# Patient Record
Sex: FEMALE | Race: WHITE | NOT HISPANIC OR LATINO | Employment: FULL TIME | URBAN - METROPOLITAN AREA
[De-identification: names, ages, dates, MRNs, and addresses within clinical notes are randomized per-mention and may not be internally consistent; named-entity substitution may affect disease eponyms.]

---

## 2019-01-19 ENCOUNTER — THERAPY VISIT (OUTPATIENT)
Dept: PHYSICAL THERAPY | Facility: CLINIC | Age: 24
End: 2019-01-19
Payer: COMMERCIAL

## 2019-01-19 DIAGNOSIS — M54.59 MECHANICAL LOW BACK PAIN: Primary | ICD-10-CM

## 2019-01-19 PROCEDURE — 97110 THERAPEUTIC EXERCISES: CPT | Mod: GP | Performed by: PHYSICAL THERAPIST

## 2019-01-19 PROCEDURE — 97112 NEUROMUSCULAR REEDUCATION: CPT | Mod: GP | Performed by: PHYSICAL THERAPIST

## 2019-01-19 PROCEDURE — 97161 PT EVAL LOW COMPLEX 20 MIN: CPT | Mod: GP | Performed by: PHYSICAL THERAPIST

## 2019-01-19 PROCEDURE — 97140 MANUAL THERAPY 1/> REGIONS: CPT | Mod: GP | Performed by: PHYSICAL THERAPIST

## 2019-01-19 ASSESSMENT — ACTIVITIES OF DAILY LIVING (ADL): STAND: I AM UNABLE TO DO THE ACTIVITY

## 2019-01-19 NOTE — PROGRESS NOTES
[facility] Initial Evaluation  Subjective:  Pt reports about 2 months ago she began to have LBP and as she worked out more it has worsened and spread into her hips.        The history is provided by the patient.   Nicki Quezada is a 23 year old female with a sacroiliac and lumbar condition.    Condition occurred: during recreation/sport.  This is a new condition  11.1.18 .    Patient reports pain:  Central lumbar spine, lumbar spine right and lumbar spine left.    Pain is described as aching and stabbing and is intermittent and reported as 5/10 and 8/10.  Associated symptoms:  Loss of motion/stiffness.    and relieved by NSAID's.  Since onset symptoms are gradually worsening.        General health as reported by patient is excellent.          Current occupation is Student, works out regularily, .            Red flags:  None as reported by the patient.                        Objective:  System         Lumbar/SI Evaluation  ROM:    AROM Lumbar:   Flexion:        Mod loss   Ext:                    Mod loss ++    Side Bend:        Left:     Right:   Rotation:           Left:     Right:   Side Glide:        Left:     Right:           Lumbar Myotomes:  not assessed            Lumbar DTR's:  not assessed      Cord Signs:  not assessed    Lumbar Dermtomes:  not assessed                Neural Tension/Mobility:  Lumbar:  Normal        Lumbar Palpation:    Tenderness present at Left:    Quadratus Lumborum; Piriformis; Gluteus Medius and Greater Trochanter  Tenderness present at Right: Quadratus Lumborum; Piriformis; Gluteus Medius and Greater Trochanter        SI joint/Sacrum:    bilat SI dysf, post rot and flare, sacral torsion, pub        Left negative at:    Ilium Ventromedial  Right positive at:    Ilium Ventromedial  Sacral conclusion left:  Outflare, descended pubic sym and upslip  Sacral conclusion right:  Posterior inominate, locked, inflare, elevated pubic sym and sacral torsion                                              General     ROS    Assessment/Plan:    Patient is a 23 year old female with lumbar and both sides hip complaints.    Patient has the following significant findings with corresponding treatment plan.                Diagnosis 1:  lbp  Pain -  manual therapy and home program  Decreased ROM/flexibility - manual therapy, therapeutic exercise and home program  Decreased joint mobility - manual therapy, therapeutic exercise and home program  Impaired muscle performance - neuro re-education and home program  Decreased function - therapeutic activities  Instability -  Therapeutic Activity  Therapeutic Exercise    Therapy Evaluation Codes:   1) History comprised of:   Personal factors that impact the plan of care:      Coping style, Overall behavior pattern and Past/current experiences.    Comorbidity factors that impact the plan of care are:      None.     Medications impacting care: Pain.  2) Examination of Body Systems comprised of:   Body structures and functions that impact the plan of care:      Hip and Lumbar spine.   Activity limitations that impact the plan of care are:      Bending, Cooking, Sitting, Sports, Squatting/kneeling, Stairs, Standing and Walking.  3) Clinical presentation characteristics are:   Stable/Uncomplicated.  4) Decision-Making    Low complexity using standardized patient assessment instrument and/or measureable assessment of functional outcome.  Cumulative Therapy Evaluation is: Low complexity.    Previous and current functional limitations:  (See Goal Flow Sheet for this information)    Short term and Long term goals: (See Goal Flow Sheet for this information)     Communication ability:  Patient appears to be able to clearly communicate and understand verbal and written communication and follow directions correctly.  Treatment Explanation - The following has been discussed with the patient:   RX ordered/plan of care  Anticipated outcomes  Possible risks and side  effects  This patient would benefit from PT intervention to resume normal activities.   Rehab potential is good.    Frequency:  1 X week, once daily  Duration:  for 4 weeks  Discharge Plan:  Achieve all LTG.  Independent in home treatment program.  Reach maximal therapeutic benefit.    Please refer to the daily flowsheet for treatment today, total treatment time and time spent performing 1:1 timed codes.

## 2019-01-21 NOTE — PROGRESS NOTES
[facility] Initial Evaluation  Subjective:                     and reported as 5/10.                General health as reported by patient is excellent.          Current occupation is Student.    Primary job tasks include:  Prolonged sitting.              Oswestry Score: 14 %                 Objective:  System    Physical Exam    General     ROS    Assessment/Plan:

## 2019-01-21 NOTE — PROGRESS NOTES
[facility] Initial Evaluation  Subjective:  HPI                    Objective:  System    Physical Exam  General     ROS    Assessment/Plan:

## 2019-02-05 ENCOUNTER — THERAPY VISIT (OUTPATIENT)
Dept: PHYSICAL THERAPY | Facility: CLINIC | Age: 24
End: 2019-02-05
Payer: COMMERCIAL

## 2019-02-05 DIAGNOSIS — M54.50 LUMBAGO: ICD-10-CM

## 2019-02-05 PROCEDURE — 97112 NEUROMUSCULAR REEDUCATION: CPT | Mod: GP | Performed by: PHYSICAL THERAPIST

## 2019-02-05 PROCEDURE — 97140 MANUAL THERAPY 1/> REGIONS: CPT | Mod: GP | Performed by: PHYSICAL THERAPIST

## 2019-02-05 PROCEDURE — 97110 THERAPEUTIC EXERCISES: CPT | Mod: GP | Performed by: PHYSICAL THERAPIST

## 2019-05-15 ENCOUNTER — RECORDS - HEALTHEAST (OUTPATIENT)
Dept: LAB | Facility: CLINIC | Age: 24
End: 2019-05-15

## 2019-05-15 LAB — TSH SERPL DL<=0.005 MIU/L-ACNC: 1.37 UIU/ML (ref 0.3–5)

## 2019-05-16 LAB — 25(OH)D3 SERPL-MCNC: 38.3 NG/ML (ref 30–80)

## 2019-10-22 PROBLEM — M54.50 LUMBAGO: Status: RESOLVED | Noted: 2019-02-05 | Resolved: 2019-10-22

## 2019-10-22 NOTE — PROGRESS NOTES
Discharge Note    Progress reporting period is from initial eval to Feb 5, 2019.     Nicki failed to return for next follow up visit and current status is unknown.  Please see information below for last relevant information on current status.  Patient seen for Rxs Used: 2 visits.    SUBJECTIVE  Subjective changes noted by patient:  Subjective: Feeling nearly 100% better. Hasn't tested it getting back in to cycling classes yet though. .  Current pain level is Current Pain level: 0/10.     Previous pain level was  Initial Pain level: 8/10.   Changes in function:  Yes (See Goal flowsheet attached for changes in current functional level)  Adverse reaction to treatment or activity: None    OBJECTIVE  Changes noted in objective findings: Objective: FIS produces mild R LBP, EIS no effect. TTP R QL, tolerates soft tissue well and has painfree FIS after.    ASSESSMENT/PLAN  Diagnosis: lbp    DIAGP:  The encounter diagnosis was Lumbago.  Updated problem list and treatment plan:     Pain - HEP  Decreased ROM/flexibility - HEP  Decreased function - HEP  Decreased strength - HEP    STG/LTGs have been met or progress has been made towards goals:  Yes, please see goal flowsheet for most current information.    Assessment of Progress: current status is unknown.  Last current status: Assessment of progress: Pt is progressing well.  Self Management Plans:  HEP    I have re-evaluated this patient and find that the nature, scope, duration and intensity of the therapy is appropriate for the medical condition of the patient.  Nicki continues to require the following intervention to meet STG and LTG's:  HEP.    Recommendations:  Discharge with current home program.  Patient to follow up with MD as needed. Episode to be closed at this time and patient formally discharged from therapy.    Todd Mays, PT, DPT, OCS      Please refer to the daily flowsheet for treatment today, total treatment time and time spent performing 1:1 timed  codes.

## 2020-01-10 ENCOUNTER — RECORDS - HEALTHEAST (OUTPATIENT)
Dept: LAB | Facility: CLINIC | Age: 25
End: 2020-01-10

## 2020-01-10 LAB
APTT PPP: 35 SECONDS (ref 24–37)
INR PPP: 0.94 (ref 0.9–1.1)

## 2022-07-13 ENCOUNTER — LAB REQUISITION (OUTPATIENT)
Dept: LAB | Facility: CLINIC | Age: 27
End: 2022-07-13
Payer: COMMERCIAL

## 2022-07-13 DIAGNOSIS — R30.0 DYSURIA: ICD-10-CM

## 2022-07-13 PROCEDURE — 87491 CHLMYD TRACH DNA AMP PROBE: CPT | Mod: ORL | Performed by: PHYSICIAN ASSISTANT

## 2022-07-13 PROCEDURE — 87086 URINE CULTURE/COLONY COUNT: CPT | Mod: ORL | Performed by: PHYSICIAN ASSISTANT

## 2022-07-14 LAB
C TRACH DNA SPEC QL PROBE+SIG AMP: NEGATIVE
N GONORRHOEA DNA SPEC QL NAA+PROBE: NEGATIVE

## 2022-07-15 LAB — BACTERIA UR CULT: NORMAL

## 2022-10-18 ENCOUNTER — LAB REQUISITION (OUTPATIENT)
Dept: LAB | Facility: CLINIC | Age: 27
End: 2022-10-18
Payer: COMMERCIAL

## 2022-10-18 DIAGNOSIS — J02.9 ACUTE PHARYNGITIS, UNSPECIFIED: ICD-10-CM

## 2022-10-18 PROCEDURE — 87081 CULTURE SCREEN ONLY: CPT | Mod: ORL | Performed by: PHYSICIAN ASSISTANT

## 2022-10-21 LAB — BACTERIA SPEC CULT: NORMAL

## 2023-03-04 ENCOUNTER — TRANSFERRED RECORDS (OUTPATIENT)
Dept: MULTI SPECIALTY CLINIC | Facility: CLINIC | Age: 28
End: 2023-03-04

## 2023-03-04 LAB — PAP SMEAR - HIM PATIENT REPORTED: NORMAL

## 2024-02-25 ENCOUNTER — HEALTH MAINTENANCE LETTER (OUTPATIENT)
Age: 29
End: 2024-02-25

## 2024-03-28 ENCOUNTER — OFFICE VISIT (OUTPATIENT)
Dept: FAMILY MEDICINE | Facility: CLINIC | Age: 29
End: 2024-03-28
Payer: COMMERCIAL

## 2024-03-28 VITALS
HEART RATE: 94 BPM | RESPIRATION RATE: 14 BRPM | HEIGHT: 66 IN | BODY MASS INDEX: 33.27 KG/M2 | OXYGEN SATURATION: 100 % | SYSTOLIC BLOOD PRESSURE: 131 MMHG | WEIGHT: 207 LBS | DIASTOLIC BLOOD PRESSURE: 86 MMHG | TEMPERATURE: 98.4 F

## 2024-03-28 DIAGNOSIS — Z00.00 ROUTINE GENERAL MEDICAL EXAMINATION AT A HEALTH CARE FACILITY: Primary | ICD-10-CM

## 2024-03-28 DIAGNOSIS — E66.09 CLASS 1 OBESITY DUE TO EXCESS CALORIES WITHOUT SERIOUS COMORBIDITY WITH BODY MASS INDEX (BMI) OF 33.0 TO 33.9 IN ADULT: ICD-10-CM

## 2024-03-28 DIAGNOSIS — R45.86 MOOD CHANGE: ICD-10-CM

## 2024-03-28 DIAGNOSIS — E66.811 CLASS 1 OBESITY DUE TO EXCESS CALORIES WITHOUT SERIOUS COMORBIDITY WITH BODY MASS INDEX (BMI) OF 33.0 TO 33.9 IN ADULT: ICD-10-CM

## 2024-03-28 LAB
ANION GAP SERPL CALCULATED.3IONS-SCNC: 12 MMOL/L (ref 7–15)
BUN SERPL-MCNC: 12.4 MG/DL (ref 6–20)
CALCIUM SERPL-MCNC: 9.5 MG/DL (ref 8.6–10)
CHLORIDE SERPL-SCNC: 102 MMOL/L (ref 98–107)
CREAT SERPL-MCNC: 0.85 MG/DL (ref 0.51–0.95)
DEPRECATED HCO3 PLAS-SCNC: 23 MMOL/L (ref 22–29)
EGFRCR SERPLBLD CKD-EPI 2021: >90 ML/MIN/1.73M2
GLUCOSE SERPL-MCNC: 92 MG/DL (ref 70–99)
LDLC SERPL DIRECT ASSAY-MCNC: 141 MG/DL
POTASSIUM SERPL-SCNC: 3.9 MMOL/L (ref 3.4–5.3)
SODIUM SERPL-SCNC: 137 MMOL/L (ref 135–145)
TSH SERPL DL<=0.005 MIU/L-ACNC: 2.18 UIU/ML (ref 0.3–4.2)

## 2024-03-28 PROCEDURE — 80048 BASIC METABOLIC PNL TOTAL CA: CPT | Performed by: PHYSICIAN ASSISTANT

## 2024-03-28 PROCEDURE — 84443 ASSAY THYROID STIM HORMONE: CPT | Performed by: PHYSICIAN ASSISTANT

## 2024-03-28 PROCEDURE — 36415 COLL VENOUS BLD VENIPUNCTURE: CPT | Performed by: PHYSICIAN ASSISTANT

## 2024-03-28 PROCEDURE — 99385 PREV VISIT NEW AGE 18-39: CPT | Performed by: PHYSICIAN ASSISTANT

## 2024-03-28 PROCEDURE — 83721 ASSAY OF BLOOD LIPOPROTEIN: CPT | Performed by: PHYSICIAN ASSISTANT

## 2024-03-28 PROCEDURE — 99213 OFFICE O/P EST LOW 20 MIN: CPT | Mod: 25 | Performed by: PHYSICIAN ASSISTANT

## 2024-03-28 RX ORDER — LISDEXAMFETAMINE DIMESYLATE 20 MG/1
20 CAPSULE ORAL EVERY MORNING
Qty: 30 CAPSULE | Refills: 0 | Status: SHIPPED | OUTPATIENT
Start: 2024-03-28 | End: 2024-06-18

## 2024-03-28 ASSESSMENT — PATIENT HEALTH QUESTIONNAIRE - PHQ9
10. IF YOU CHECKED OFF ANY PROBLEMS, HOW DIFFICULT HAVE THESE PROBLEMS MADE IT FOR YOU TO DO YOUR WORK, TAKE CARE OF THINGS AT HOME, OR GET ALONG WITH OTHER PEOPLE: SOMEWHAT DIFFICULT
SUM OF ALL RESPONSES TO PHQ QUESTIONS 1-9: 13
SUM OF ALL RESPONSES TO PHQ QUESTIONS 1-9: 13

## 2024-03-28 ASSESSMENT — PAIN SCALES - GENERAL: PAINLEVEL: NO PAIN (0)

## 2024-03-28 NOTE — PROGRESS NOTES
"Preventive Care Visit  [facility] JEIMY Lee PA-C, Family Medicine  Mar 28, 2024      Assessment & Plan     Routine general medical examination at a health care facility  Will get fasting labs done.  - TSH with free T4 reflex; Future  - Basic metabolic panel  (Ca, Cl, CO2, Creat, Gluc, K, Na, BUN); Future  - LDL cholesterol direct; Future  - TSH with free T4 reflex  - Basic metabolic panel  (Ca, Cl, CO2, Creat, Gluc, K, Na, BUN)  - LDL cholesterol direct    Class 1 obesity due to excess calories without serious comorbidity with body mass index (BMI) of 33.0 to 33.9 in adult  Patient with binge type eating, mostly in the AM but having trouble controlling this.  May have ADHD but would need formal eval.  Trial of vyvanse.  Discussed mechanism of action of medication, proper dosing, potential side effects and appropriate follow up.    - lisdexamfetamine (VYVANSE) 20 MG capsule; Take 1 capsule (20 mg) by mouth every morning  - TSH with free T4 reflex; Future  - Basic metabolic panel  (Ca, Cl, CO2, Creat, Gluc, K, Na, BUN); Future  - LDL cholesterol direct; Future  - TSH with free T4 reflex  - Basic metabolic panel  (Ca, Cl, CO2, Creat, Gluc, K, Na, BUN)  - LDL cholesterol direct    Mood change  PHQ is 13, patient admits to feeling stress and down at times.  Also not sure if related to trouble focusing and/or weight.  She is agreeable to ssri therapy.  Will follow up one month for Vyvanse med check and can decide at that time.        BMI  Estimated body mass index is 33.41 kg/m  as calculated from the following:    Height as of this encounter: 1.676 m (5' 6\").    Weight as of this encounter: 93.9 kg (207 lb).   Weight management plan: Discussed healthy diet and exercise guidelines    Depression Screening Follow Up        3/28/2024    12:37 PM   PHQ   PHQ-9 Total Score 13   Q9: Thoughts of better off dead/self-harm past 2 weeks Not at all           Follow Up Actions Taken  Crisis " resource information provided in After Visit Summary  Follow up recommended: one month            Subjective   Nicki is a 28 year old, presenting for the following:  Physical (Pap due) and Establish Care        3/28/2024    12:59 PM   Additional Questions   Roomed by Hattie Mott VF          Would like to discuss weight management options.    Pap declined - has an OB.    Therapist made the diagnosis of ADHD but no formal neuropsych eval has been done.  Used to take Adderall but stopped due to side effects.  Has done therapy in the past as well.  When she was on the stimulant she did notice better eating habits and has concerns about her weight today.  Mornings now are worse for food cravings but thinks about it all day basically    Not sure overall how she felt on the stimulants.  Wondering about weight options.    Mood is not ideal- does feel sad at times.  Never been treated for anxiety or depression.      Via the Health Maintenance questionnaire, the patient has reported the following services have been completed -Cervical Cancer Screening, this information has been sent to the abstraction team.    Health Care Directive  Patient does not have a Health Care Directive or Living Will:     HPI        3/28/2024    12:37 PM   PHQ   PHQ-9 Total Score 13   Q9: Thoughts of better off dead/self-harm past 2 weeks Not at all                No data to display                   No data to display                   No data to display                      No data to display                   Today's PHQ-9 Score:       3/28/2024    12:37 PM   PHQ-9 SCORE   PHQ-9 Total Score MyChart 13 (Moderate depression)   PHQ-9 Total Score 13          No data to display              Social History     Tobacco Use    Smoking status: Never    Smokeless tobacco: Never   Vaping Use    Vaping Use: Never used             3/28/2024   Breast Cancer Screening   Family history of breast, colon, or ovarian cancer? No / Unknown      Mammogram Screening -  "Patient under 40 years of age: Routine Mammogram Screening not recommended.       History of abnormal Pap smear: NO - age 21-29 PAP every 3 years recommended              No data to display                 Reviewed and updated as needed this visit by Provider                          Review of Systems  Constitutional, HEENT, cardiovascular, pulmonary, gi and gu systems are negative, except as otherwise noted.     Objective    Exam  /86 (BP Location: Right arm, Patient Position: Sitting, Cuff Size: Adult Large)   Pulse 94   Temp 98.4  F (36.9  C) (Oral)   Resp 14   Ht 1.676 m (5' 6\")   Wt 93.9 kg (207 lb)   LMP 03/14/2024 (Exact Date)   SpO2 100%   BMI 33.41 kg/m     Estimated body mass index is 33.41 kg/m  as calculated from the following:    Height as of this encounter: 1.676 m (5' 6\").    Weight as of this encounter: 93.9 kg (207 lb).    Physical Exam  GENERAL: alert and no distress  EYES: Eyes grossly normal to inspection, PERRL and conjunctivae and sclerae normal  HENT: ear canals and TM's normal, nose and mouth without ulcers or lesions  NECK: no adenopathy, no asymmetry, masses, or scars  RESP: lungs clear to auscultation - no rales, rhonchi or wheezes  CV: regular rate and rhythm, normal S1 S2, no S3 or S4, no murmur, click or rub, no peripheral edema  ABDOMEN: soft, nontender, no hepatosplenomegaly, no masses and bowel sounds normal  MS: no gross musculoskeletal defects noted, no edema  SKIN: no suspicious lesions or rashes  NEURO: Normal strength and tone, mentation intact and speech normal  PSYCH: mentation appears normal, affect normal/bright        Signed Electronically by: Phu Lee PA-C    Answers submitted by the patient for this visit:  Patient Health Questionnaire (Submitted on 3/28/2024)  If you checked off any problems, how difficult have these problems made it for you to do your work, take care of things at home, or get along with other people?: Somewhat difficult  PHQ9 " TOTAL SCORE: 13

## 2024-03-28 NOTE — PATIENT INSTRUCTIONS
Preventive Care Advice   This is general advice given by our system to help you stay healthy. However, your care team may have specific advice just for you. Please talk to your care team about your preventive care needs.  Nutrition  Eat 5 or more servings of fruits and vegetables each day.  Try wheat bread, brown rice and whole grain pasta (instead of white bread, rice, and pasta).  Get enough calcium and vitamin D. Check the label on foods and aim for 100% of the RDA (recommended daily allowance).  Lifestyle  Exercise at least 150 minutes each week   (30 minutes a day, 5 days a week).  Do muscle strengthening activities 2 days a week. These help control your weight and prevent disease.  No smoking.  Wear sunscreen to prevent skin cancer.  Have a dental exam and cleaning every 6 months.  Yearly exams  See your health care team every year to talk about:  Any changes in your health.  Any medicines your care team has prescribed.  Preventive care, family planning, and ways to prevent chronic diseases.  Shots (vaccines)   HPV shots (up to age 26), if you've never had them before.  Hepatitis B shots (up to age 59), if you've never had them before.  COVID-19 shot: Get this shot when it's due.  Flu shot: Get a flu shot every year.  Tetanus shot: Get a tetanus shot every 10 years.  Pneumococcal, hepatitis A, and RSV shots: Ask your care team if you need these based on your risk.  Shingles shot (for age 50 and up).  General health tests  Diabetes screening:  Starting at age 35, Get screened for diabetes at least every 3 years.  If you are younger than age 35, ask your care team if you should be screened for diabetes.  Cholesterol test: At age 39, start having a cholesterol test every 5 years, or more often if advised.  Bone density scan (DEXA): At age 50, ask your care team if you should have this scan for osteoporosis (brittle bones).  Hepatitis C: Get tested at least once in your life.  STIs (sexually transmitted  infections)  Before age 24: Ask your care team if you should be screened for STIs.  After age 24: Get screened for STIs if you're at risk. You are at risk for STIs (including HIV) if:  You are sexually active with more than one person.  You don't use condoms every time.  You or a partner was diagnosed with a sexually transmitted infection.  If you are at risk for HIV, ask about PrEP medicine to prevent HIV.  Get tested for HIV at least once in your life, whether you are at risk for HIV or not.  Cancer screening tests  Cervical cancer screening: If you have a cervix, begin getting regular cervical cancer screening tests at age 21. Most people who have regular screenings with normal results can stop after age 65. Talk about this with your provider.  Breast cancer scan (mammogram): If you've ever had breasts, begin having regular mammograms starting at age 40. This is a scan to check for breast cancer.  Colon cancer screening: It is important to start screening for colon cancer at age 45.  Have a colonoscopy test every 10 years (or more often if you're at risk) Or, ask your provider about stool tests like a FIT test every year or Cologuard test every 3 years.  To learn more about your testing options, visit: https://www.[employer]/683163.pdf.  For help making a decision, visit: https://bit.ly/jz86450.  Prostate cancer screening test: If you have a prostate and are age 55 to 69, ask your provider if you would benefit from a yearly prostate cancer screening test.  Lung cancer screening: If you are a current or former smoker age 50 to 80, ask your care team if ongoing lung cancer screenings are right for you.  For informational purposes only. Not to replace the advice of your health care provider. Copyright   2023 [employer]. All rights reserved. Clinically reviewed by the [facility] Transitions Program. [employer] [id number] - REV 01/24.

## 2024-05-02 ENCOUNTER — OFFICE VISIT (OUTPATIENT)
Dept: FAMILY MEDICINE | Facility: CLINIC | Age: 29
End: 2024-05-02
Payer: COMMERCIAL

## 2024-05-02 VITALS
SYSTOLIC BLOOD PRESSURE: 144 MMHG | HEIGHT: 66 IN | RESPIRATION RATE: 16 BRPM | HEART RATE: 67 BPM | BODY MASS INDEX: 33.57 KG/M2 | OXYGEN SATURATION: 98 % | TEMPERATURE: 97.6 F | DIASTOLIC BLOOD PRESSURE: 80 MMHG | WEIGHT: 208.9 LBS

## 2024-05-02 DIAGNOSIS — E66.09 CLASS 1 OBESITY DUE TO EXCESS CALORIES WITHOUT SERIOUS COMORBIDITY WITH BODY MASS INDEX (BMI) OF 33.0 TO 33.9 IN ADULT: Primary | ICD-10-CM

## 2024-05-02 DIAGNOSIS — E66.811 CLASS 1 OBESITY DUE TO EXCESS CALORIES WITHOUT SERIOUS COMORBIDITY WITH BODY MASS INDEX (BMI) OF 33.0 TO 33.9 IN ADULT: Primary | ICD-10-CM

## 2024-05-02 DIAGNOSIS — R45.86 MOOD CHANGE: ICD-10-CM

## 2024-05-02 PROCEDURE — G2211 COMPLEX E/M VISIT ADD ON: HCPCS | Performed by: PHYSICIAN ASSISTANT

## 2024-05-02 PROCEDURE — 99214 OFFICE O/P EST MOD 30 MIN: CPT | Performed by: PHYSICIAN ASSISTANT

## 2024-05-02 ASSESSMENT — ANXIETY QUESTIONNAIRES
2. NOT BEING ABLE TO STOP OR CONTROL WORRYING: SEVERAL DAYS
5. BEING SO RESTLESS THAT IT IS HARD TO SIT STILL: NOT AT ALL
GAD7 TOTAL SCORE: 5
1. FEELING NERVOUS, ANXIOUS, OR ON EDGE: SEVERAL DAYS
IF YOU CHECKED OFF ANY PROBLEMS ON THIS QUESTIONNAIRE, HOW DIFFICULT HAVE THESE PROBLEMS MADE IT FOR YOU TO DO YOUR WORK, TAKE CARE OF THINGS AT HOME, OR GET ALONG WITH OTHER PEOPLE: SOMEWHAT DIFFICULT
GAD7 TOTAL SCORE: 5
7. FEELING AFRAID AS IF SOMETHING AWFUL MIGHT HAPPEN: SEVERAL DAYS
6. BECOMING EASILY ANNOYED OR IRRITABLE: SEVERAL DAYS
3. WORRYING TOO MUCH ABOUT DIFFERENT THINGS: SEVERAL DAYS

## 2024-05-02 ASSESSMENT — PATIENT HEALTH QUESTIONNAIRE - PHQ9
SUM OF ALL RESPONSES TO PHQ QUESTIONS 1-9: 9
5. POOR APPETITE OR OVEREATING: NOT AT ALL

## 2024-05-02 NOTE — PROGRESS NOTES
Assessment & Plan     Class 1 obesity due to excess calories without serious comorbidity with body mass index (BMI) of 33.0 to 33.9 in adult  Will do compounded wegovy with  pharmacy.    Discussed mechanism of action of medication, proper dosing, potential side effects and appropriate follow up.    - Semaglutide-Weight Management (WEGOVY) 0.25 MG/0.5ML pen; Inject 0.25 mg Subcutaneous once a week  - Semaglutide-Weight Management (WEGOVY) 0.5 MG/0.5ML pen; Inject 0.5 mg Subcutaneous once a week    Mood change  She still wants to wait on ssri or other stimulant to see if weight loss improves her mood overall.      The longitudinal plan of care for the diagnosis(es)/condition(s) as documented were addressed during this visit. Due to the added complexity in care, I will continue to support Nicki in the subsequent management and with ongoing continuity of care.      Subjective   Nicki is a 28 year old, presenting for the following health issues:  follow up and med check      5/2/2024     1:09 PM   Additional Questions   Roomed by Tiana GARDINER     History of Present Illness       Reason for visit:  Check in on meds/weight management    She eats 2-3 servings of fruits and vegetables daily.She consumes 0 sweetened beverage(s) daily.She exercises with enough effort to increase her heart rate 30 to 60 minutes per day.  She exercises with enough effort to increase her heart rate 5 days per week. She is missing 7 dose(s) of medications per week.  She is not taking prescribed medications regularly due to side effects.     Headache when taking vyvanse.  Not sure how much it was helping mood/focus- maybe some.  But it did get her to not think so much about food.  Still interested in discussing something for weight loss.  This tends to contribute to her decrease in mood.    Had ADHD eval a while back- no record here.  Entira.        Review of Systems  Constitutional, HEENT, cardiovascular, pulmonary, gi and gu systems are  "negative, except as otherwise noted.      Objective    BP (!) 144/80   Pulse 67   Temp 97.6  F (36.4  C) (Oral)   Resp 16   Ht 1.676 m (5' 6\")   Wt 94.8 kg (208 lb 14.4 oz)   LMP 03/14/2024 (Exact Date)   SpO2 98%   BMI 33.72 kg/m    Body mass index is 33.72 kg/m .  Physical Exam   GENERAL: alert and no distress  NECK: no adenopathy, no asymmetry, masses, or scars  CV: regular rate and rhythm, normal S1 S2, no S3 or S4, no murmur, click or rub, no peripheral edema  MS: no gross musculoskeletal defects noted, no edema  PSYCH: mentation appears normal, affect normal/bright            Signed Electronically by: Phu Lee PA-C    "

## 2024-06-18 ENCOUNTER — OFFICE VISIT (OUTPATIENT)
Dept: FAMILY MEDICINE | Facility: CLINIC | Age: 29
End: 2024-06-18
Payer: COMMERCIAL

## 2024-06-18 VITALS
OXYGEN SATURATION: 98 % | BODY MASS INDEX: 33.72 KG/M2 | HEIGHT: 65 IN | TEMPERATURE: 97.6 F | SYSTOLIC BLOOD PRESSURE: 111 MMHG | RESPIRATION RATE: 16 BRPM | HEART RATE: 72 BPM | DIASTOLIC BLOOD PRESSURE: 77 MMHG | WEIGHT: 202.4 LBS

## 2024-06-18 DIAGNOSIS — E66.811 CLASS 1 OBESITY DUE TO EXCESS CALORIES WITHOUT SERIOUS COMORBIDITY WITH BODY MASS INDEX (BMI) OF 33.0 TO 33.9 IN ADULT: Primary | ICD-10-CM

## 2024-06-18 DIAGNOSIS — R45.86 MOOD CHANGE: ICD-10-CM

## 2024-06-18 DIAGNOSIS — E66.09 CLASS 1 OBESITY DUE TO EXCESS CALORIES WITHOUT SERIOUS COMORBIDITY WITH BODY MASS INDEX (BMI) OF 33.0 TO 33.9 IN ADULT: Primary | ICD-10-CM

## 2024-06-18 PROCEDURE — 99213 OFFICE O/P EST LOW 20 MIN: CPT | Performed by: PHYSICIAN ASSISTANT

## 2024-06-18 ASSESSMENT — PAIN SCALES - GENERAL: PAINLEVEL: NO PAIN (0)

## 2024-06-18 NOTE — PROGRESS NOTES
"  Assessment & Plan     Class 1 obesity due to excess calories without serious comorbidity with body mass index (BMI) of 33.0 to 33.9 in adult  Patient doing well, early in treatment with GPL-1.  Wants to stay at 0.5mg dose for a while.  Refilled x 6 months.  - Semaglutide-Weight Management (WEGOVY) 0.5 MG/0.5ML pen; Inject 0.5 mg Subcutaneous once a week    Mood change  Starting therapy.  Mood improving.          Radha Caceres is a 28 year old, presenting for the following health issues:  Recheck Medication        6/18/2024    10:57 AM   Additional Questions   Roomed by Domenica BARON MA   Accompanied by self         6/18/2024    10:57 AM   Patient Reported Additional Medications   Patient reports taking the following new medications none     History of Present Illness       Reason for visit:  Medication check    She eats 4 or more servings of fruits and vegetables daily.She consumes 0 sweetened beverage(s) daily.She exercises with enough effort to increase her heart rate 30 to 60 minutes per day.  She exercises with enough effort to increase her heart rate 4 days per week.   She is taking medications regularly.         Medication Followup of Semaglutide-Weight Management (WEGOVY)   Taking Medication as prescribed: yes  Side Effects:  None  Medication Helping Symptoms:  yes    Up to the 0.5mg dose- doing very well.  No side effects    Mood improved.  Starting therapy in a week.  Drinking less as well.    Wt Readings from Last 4 Encounters:   06/18/24 91.8 kg (202 lb 6.4 oz)   05/02/24 94.8 kg (208 lb 14.4 oz)   03/28/24 93.9 kg (207 lb)           Review of Systems  Constitutional, HEENT, cardiovascular, pulmonary, gi and gu systems are negative, except as otherwise noted.      Objective    /77 (BP Location: Right arm, Patient Position: Sitting, Cuff Size: Adult Large)   Pulse 72   Temp 97.6  F (36.4  C) (Oral)   Resp 16   Ht 1.651 m (5' 5\")   Wt 91.8 kg (202 lb 6.4 oz)   SpO2 98%   BMI 33.68 kg/m  "   Body mass index is 33.68 kg/m .  Physical Exam   GENERAL: alert and no distress  NECK: no adenopathy, no asymmetry, masses, or scars  CV: regular rate and rhythm, normal S1 S2, no S3 or S4, no murmur, click or rub, no peripheral edema  MS: no gross musculoskeletal defects noted, no edema  PSYCH: mentation appears normal, affect normal/bright            Signed Electronically by: Phu Lee PA-C

## 2024-07-25 ENCOUNTER — MYC MEDICAL ADVICE (OUTPATIENT)
Dept: FAMILY MEDICINE | Facility: CLINIC | Age: 29
End: 2024-07-25
Payer: COMMERCIAL

## 2024-07-25 DIAGNOSIS — T75.3XXA MOTION SICKNESS, INITIAL ENCOUNTER: Primary | ICD-10-CM

## 2024-07-25 NOTE — TELEPHONE ENCOUNTER
Called patient to gather more info. Patient has always struggled with motion sickness. Has had episodes of this ever since she was a young child. Episodes include dizziness, nausea, feeling lightheaded. This happens every time patient is in the car for more than 30 minutes.     Symptoms are worse since starting Wegovy. Denies that the room spins. Motion sickness is really unbearable during flights. She is asking for a prescription for zofran for her upcoming flight. Routing to provider to advise.    Soo Carnes RN on 7/25/2024 at 3:49 PM

## 2024-07-29 RX ORDER — ONDANSETRON 4 MG/1
4 TABLET, FILM COATED ORAL EVERY 8 HOURS PRN
Qty: 30 TABLET | Refills: 0 | Status: SHIPPED | OUTPATIENT
Start: 2024-07-29

## 2024-08-20 ENCOUNTER — MYC MEDICAL ADVICE (OUTPATIENT)
Dept: FAMILY MEDICINE | Facility: CLINIC | Age: 29
End: 2024-08-20
Payer: COMMERCIAL

## 2024-08-20 DIAGNOSIS — R11.0 NAUSEA: ICD-10-CM

## 2024-08-20 DIAGNOSIS — E66.09 CLASS 1 OBESITY DUE TO EXCESS CALORIES WITHOUT SERIOUS COMORBIDITY WITH BODY MASS INDEX (BMI) OF 33.0 TO 33.9 IN ADULT: Primary | ICD-10-CM

## 2024-08-20 DIAGNOSIS — E66.811 CLASS 1 OBESITY DUE TO EXCESS CALORIES WITHOUT SERIOUS COMORBIDITY WITH BODY MASS INDEX (BMI) OF 33.0 TO 33.9 IN ADULT: Primary | ICD-10-CM

## 2024-08-21 RX ORDER — ONDANSETRON 4 MG/1
4 TABLET, FILM COATED ORAL EVERY 8 HOURS PRN
Qty: 30 TABLET | Refills: 1 | Status: SHIPPED | OUTPATIENT
Start: 2024-08-21

## 2024-08-21 NOTE — TELEPHONE ENCOUNTER
Rx for 1mg sent in with 2 refills.  If she wants some zofran I could do that.  Let me know.      Phu

## 2024-11-15 ENCOUNTER — MYC MEDICAL ADVICE (OUTPATIENT)
Dept: FAMILY MEDICINE | Facility: CLINIC | Age: 29
End: 2024-11-15
Payer: COMMERCIAL

## 2024-11-15 DIAGNOSIS — E66.811 CLASS 1 OBESITY DUE TO EXCESS CALORIES WITHOUT SERIOUS COMORBIDITY WITH BODY MASS INDEX (BMI) OF 33.0 TO 33.9 IN ADULT: Primary | ICD-10-CM

## 2024-11-15 DIAGNOSIS — E66.09 CLASS 1 OBESITY DUE TO EXCESS CALORIES WITHOUT SERIOUS COMORBIDITY WITH BODY MASS INDEX (BMI) OF 33.0 TO 33.9 IN ADULT: Primary | ICD-10-CM

## 2024-11-15 NOTE — TELEPHONE ENCOUNTER
Routing to Phu. Ok to lower dose for next week and see if symptoms improve or wanting appt to discuss further?    Madeleine Reno, JOSELYNN, RN     [facility]    11/15/2024 at 9:08 AM

## 2024-11-15 NOTE — TELEPHONE ENCOUNTER
I'm sorry to hear that she is not feeling well.  We can certainly try a lower dose again.  If pain or symptoms escalate however she should be seen, acute in the ED if needed, or office.    Phu

## 2025-02-10 ENCOUNTER — MYC MEDICAL ADVICE (OUTPATIENT)
Dept: FAMILY MEDICINE | Facility: CLINIC | Age: 30
End: 2025-02-10
Payer: COMMERCIAL

## 2025-02-10 DIAGNOSIS — E66.811 CLASS 1 OBESITY DUE TO EXCESS CALORIES WITHOUT SERIOUS COMORBIDITY WITH BODY MASS INDEX (BMI) OF 33.0 TO 33.9 IN ADULT: Primary | ICD-10-CM

## 2025-02-10 DIAGNOSIS — E66.09 CLASS 1 OBESITY DUE TO EXCESS CALORIES WITHOUT SERIOUS COMORBIDITY WITH BODY MASS INDEX (BMI) OF 33.0 TO 33.9 IN ADULT: Primary | ICD-10-CM

## 2025-04-02 ENCOUNTER — MYC MEDICAL ADVICE (OUTPATIENT)
Dept: FAMILY MEDICINE | Facility: CLINIC | Age: 30
End: 2025-04-02
Payer: COMMERCIAL

## 2025-04-02 DIAGNOSIS — E66.811 CLASS 1 OBESITY DUE TO EXCESS CALORIES WITHOUT SERIOUS COMORBIDITY WITH BODY MASS INDEX (BMI) OF 33.0 TO 33.9 IN ADULT: Primary | ICD-10-CM

## 2025-04-02 DIAGNOSIS — E66.09 CLASS 1 OBESITY DUE TO EXCESS CALORIES WITHOUT SERIOUS COMORBIDITY WITH BODY MASS INDEX (BMI) OF 33.0 TO 33.9 IN ADULT: Primary | ICD-10-CM

## 2025-04-03 NOTE — TELEPHONE ENCOUNTER
[employer] message sent with provider response.     Jeanne Ag RN 4/3/2025 1:30 PM  [facility]

## 2025-05-17 ENCOUNTER — HEALTH MAINTENANCE LETTER (OUTPATIENT)
Age: 30
End: 2025-05-17